# Patient Record
Sex: FEMALE | Race: WHITE | NOT HISPANIC OR LATINO | Employment: UNEMPLOYED | URBAN - METROPOLITAN AREA
[De-identification: names, ages, dates, MRNs, and addresses within clinical notes are randomized per-mention and may not be internally consistent; named-entity substitution may affect disease eponyms.]

---

## 2018-01-03 ENCOUNTER — OFFICE VISIT (OUTPATIENT)
Dept: FAMILY MEDICINE | Facility: CLINIC | Age: 58
End: 2018-01-03
Payer: MEDICARE

## 2018-01-03 VITALS
SYSTOLIC BLOOD PRESSURE: 110 MMHG | OXYGEN SATURATION: 97 % | HEIGHT: 63 IN | TEMPERATURE: 97.5 F | WEIGHT: 160 LBS | HEART RATE: 80 BPM | BODY MASS INDEX: 28.35 KG/M2 | DIASTOLIC BLOOD PRESSURE: 76 MMHG

## 2018-01-03 DIAGNOSIS — F33.9 RECURRENT MAJOR DEPRESSIVE DISORDER, REMISSION STATUS UNSPECIFIED (H): ICD-10-CM

## 2018-01-03 DIAGNOSIS — I10 BENIGN ESSENTIAL HYPERTENSION: ICD-10-CM

## 2018-01-03 DIAGNOSIS — G47.00 INSOMNIA, UNSPECIFIED TYPE: ICD-10-CM

## 2018-01-03 DIAGNOSIS — F41.9 ANXIETY: Primary | ICD-10-CM

## 2018-01-03 DIAGNOSIS — J45.20 MILD INTERMITTENT ASTHMA WITHOUT COMPLICATION: ICD-10-CM

## 2018-01-03 PROCEDURE — 99203 OFFICE O/P NEW LOW 30 MIN: CPT | Performed by: FAMILY MEDICINE

## 2018-01-03 RX ORDER — LISINOPRIL 40 MG/1
40 TABLET ORAL DAILY
Qty: 30 TABLET | Refills: 1 | Status: SHIPPED | OUTPATIENT
Start: 2018-01-03

## 2018-01-03 RX ORDER — LAMOTRIGINE 150 MG/1
150 TABLET ORAL DAILY
Qty: 30 TABLET | Refills: 1 | Status: SHIPPED | OUTPATIENT
Start: 2018-01-03

## 2018-01-03 RX ORDER — TRAZODONE HYDROCHLORIDE 50 MG/1
25 TABLET, FILM COATED ORAL AT BEDTIME
Qty: 30 TABLET | Refills: 1 | Status: SHIPPED | OUTPATIENT
Start: 2018-01-03 | End: 2018-01-26

## 2018-01-03 RX ORDER — MONTELUKAST SODIUM 10 MG/1
10 TABLET ORAL AT BEDTIME
Qty: 30 TABLET | Refills: 1 | Status: SHIPPED | OUTPATIENT
Start: 2018-01-03

## 2018-01-03 ASSESSMENT — PATIENT HEALTH QUESTIONNAIRE - PHQ9
5. POOR APPETITE OR OVEREATING: NEARLY EVERY DAY
SUM OF ALL RESPONSES TO PHQ QUESTIONS 1-9: 12

## 2018-01-03 ASSESSMENT — ANXIETY QUESTIONNAIRES
5. BEING SO RESTLESS THAT IT IS HARD TO SIT STILL: SEVERAL DAYS
IF YOU CHECKED OFF ANY PROBLEMS ON THIS QUESTIONNAIRE, HOW DIFFICULT HAVE THESE PROBLEMS MADE IT FOR YOU TO DO YOUR WORK, TAKE CARE OF THINGS AT HOME, OR GET ALONG WITH OTHER PEOPLE: SOMEWHAT DIFFICULT
6. BECOMING EASILY ANNOYED OR IRRITABLE: MORE THAN HALF THE DAYS
7. FEELING AFRAID AS IF SOMETHING AWFUL MIGHT HAPPEN: MORE THAN HALF THE DAYS
2. NOT BEING ABLE TO STOP OR CONTROL WORRYING: NEARLY EVERY DAY
GAD7 TOTAL SCORE: 17
3. WORRYING TOO MUCH ABOUT DIFFERENT THINGS: NEARLY EVERY DAY
1. FEELING NERVOUS, ANXIOUS, OR ON EDGE: NEARLY EVERY DAY

## 2018-01-03 NOTE — Clinical Note
Please abstract the following data from this visit with this patient into the appropriate field in Epic:  Colonoscopy done on this date: 1/2010 (approximately), by this group: [place]  results were normal

## 2018-01-03 NOTE — MR AVS SNAPSHOT
After Visit Summary   1/3/2018    Jayne Cespedes    MRN: [id number]           Patient Information     Date Of Birth          1960        Visit Information        Provider Department      1/3/2018 4:00 PM Terry Hobson MD [facility]        Today's Diagnoses     Anxiety    -  1    Recurrent major depressive disorder, remission status unspecified (H)        Benign essential hypertension        Mild intermittent asthma without complication        Insomnia, unspecified type           Follow-ups after your visit        Follow-up notes from your care team     Return in about 2 weeks (around 1/17/2018) for Physical Exam.      Your next 10 appointments already scheduled     Jan 16, 2018  8:00 AM CST   Office Visit with Terry Hobson MD   [facility] [place] ([facility])    [address] [place] [id number] [phone]           Bring a current list of meds and any records pertaining to this visit. For Physicals, please bring immunization records and any forms needing to be filled out. Please arrive 10 minutes early to complete paperwork.              Who to contact     If you have questions or need follow up information about today's clinic visit or your schedule please contact [facility] directly at [phone].  Normal or non-critical lab and imaging results will be communicated to you by MyChart, letter or phone within 4 business days after the clinic has received the results. If you do not hear from us within 7 days, please contact the clinic through MyChart or phone. If you have a critical or abnormal lab result, we will notify you by phone as soon as possible.  Submit refill requests through [employer] or call your pharmacy and they will forward the refill request to us. Please allow 3 business days for your refill to be completed.          Additional Information About Your Visit        MyChart Information      "[employer] lets you send messages to your doctor, view your test results, renew your prescriptions, schedule appointments and more. To sign up, go to [email or web address] . Click on \"Log in\" on the left side of the screen, which will take you to the Welcome page. Then click on \"Sign up Now\" on the right side of the page.     You will be asked to enter the access code listed below, as well as some personal information. Please follow the directions to create your username and password.     Your access code is: [email or web address]  Expires: 4/3/2018  4:40 PM     Your access code will  in 90 days. If you need help or a new code, please call your [place] clinic or [phone].        Care EveryWhere ID     This is your Care EveryWhere ID. This could be used by other organizations to access your [place] medical records  [id number]        Your Vitals Were     Pulse Temperature Height Pulse Oximetry BMI (Body Mass Index)       80 97.5  F (36.4  C) (Tympanic) 5' 3.48\" (1.612 m) 97% 27.91 kg/m2        Blood Pressure from Last 3 Encounters:   18 110/76    Weight from Last 3 Encounters:   18 160 lb (72.6 kg)              Today, you had the following     No orders found for display         Today's Medication Changes          These changes are accurate as of: 1/3/18  4:40 PM.  If you have any questions, ask your nurse or doctor.               These medicines have changed or have updated prescriptions.        Dose/Directions    lamoTRIgine 150 MG tablet   Commonly known as:  LaMICtal   This may have changed:    - medication strength  - when to take this   Used for:  Anxiety, Recurrent major depressive disorder, remission status unspecified (H)   Changed by:  Terry Hobson MD        Dose:  150 mg   Take 1 tablet (150 mg) by mouth daily   Quantity:  30 tablet   Refills:  1       lisinopril 40 MG tablet   Commonly known as:  PRINIVIL/ZESTRIL   This may have changed:    - medication strength  - when to take this   Used " for:  Benign essential hypertension   Changed by:  Terry Hobson MD        Dose:  40 mg   Take 1 tablet (40 mg) by mouth daily   Quantity:  30 tablet   Refills:  1       montelukast 10 MG tablet   Commonly known as:  SINGULAIR   This may have changed:    - medication strength  - when to take this   Used for:  Anxiety, Recurrent major depressive disorder, remission status unspecified (H), Mild intermittent asthma without complication   Changed by:  Terry Hobson MD        Dose:  10 mg   Take 1 tablet (10 mg) by mouth At Bedtime   Quantity:  30 tablet   Refills:  1       traZODone 50 MG tablet   Commonly known as:  DESYREL   This may have changed:  when to take this   Used for:  Insomnia, unspecified type   Changed by:  Terry Hobson MD        Dose:  25 mg   Take 0.5 tablets (25 mg) by mouth At Bedtime   Quantity:  30 tablet   Refills:  1            Where to get your medicines      These medications were sent to [id number] [place], [place] - [address]  [address] [address] [facility] [address]     Phone:  [phone]     lamoTRIgine 150 MG tablet    lisinopril 40 MG tablet    montelukast 10 MG tablet    traZODone 50 MG tablet                Primary Care Provider Office Phone # Fax #    Niurka H Daisy Hernandez [phone]       [facility] [address] [place] [address]        Equal Access to Services     [facility] AH: Hadii aad ku hadasho Soomaali, waaxda luqadaha, qaybta kaalmada adeegyada, bre crawfordin hayaan sea lópez . So [facility] [phone].    ATENCIÓN: Si habla español, tiene a georges disposición servicios gratuitos de asistencia lingüística. José Luis al [phone].    We comply with applicable federal civil rights laws and [place] laws. We do not discriminate on the basis of race, color, national origin, age, disability, sex, sexual orientation, or gender identity.            Thank you!     Thank you for choosing [facility]  for your care. Our goal is always to provide you with excellent care. Hearing back from our patients is one way we can continue to improve our services. Please take a few minutes to complete the written survey that you may receive in the mail after your visit with us. Thank you!             Your Updated Medication List - Protect others around you: Learn how to safely use, store and throw away your medicines at www.disposemymeds.org.          This list is accurate as of: 1/3/18  4:40 PM.  Always use your most recent med list.                   Brand Name Dispense Instructions for use Diagnosis    BUSPIRONE HCL PO      Take 30 mg by mouth        lamoTRIgine 150 MG tablet    LaMICtal    30 tablet    Take 1 tablet (150 mg) by mouth daily    Anxiety, Recurrent major depressive disorder, remission status unspecified (H)       lisinopril 40 MG tablet    PRINIVIL/ZESTRIL    30 tablet    Take 1 tablet (40 mg) by mouth daily    Benign essential hypertension       montelukast 10 MG tablet    SINGULAIR    30 tablet    Take 1 tablet (10 mg) by mouth At Bedtime    Anxiety, Recurrent major depressive disorder, remission status unspecified (H), Mild intermittent asthma without complication       traZODone 50 MG tablet    DESYREL    30 tablet    Take 0.5 tablets (25 mg) by mouth At Bedtime    Insomnia, unspecified type       WELLBUTRIN PO      Take 300 mg % by mouth

## 2018-01-03 NOTE — PROGRESS NOTES
SUBJECTIVE:   Jayne Cespedes is a 57 year old female who presents to clinic today for the following health issues:      Medication Followup of  All medications, pt moved from [place]     Taking Medication as prescribed: yes    Side Effects:   Nasal congestion     Medication Helping Symptoms:  yes   Patient is 57 or-year-old female who moved from [place] back to [place].  She has past medical history of depression and anxiety.  She has been taking Lamictal 150 mg along with Wellbutrin and BuSpar.  Last visit she had was 4 months ago in [place].  She denies any new symptoms.  She is stable on these doses.  She is trying to find a new psychiatrist.  Other medical conditions include insomnia for which she takes as needed trazodone along with mild intermittent asthma and takes Singulair and albuterol inhaler.  She is not sure if she has any mammogram in recent years along with Pap smear.  She is planning to follow-up in 1-2 weeks for physical and labs.          Problem list and histories reviewed & adjusted, as indicated.  Additional history: as documented    Patient Active Problem List   Diagnosis     Recurrent major depressive disorder, remission status unspecified (H)     Anxiety     Mild intermittent asthma without complication     Insomnia, unspecified type     No past surgical history on file.    Social History   Substance Use Topics     Smoking status: Light Tobacco Smoker     Smokeless tobacco: Never Used     Alcohol use Yes     No family history on file.      Current Outpatient Prescriptions   Medication Sig Dispense Refill     BUSPIRONE HCL PO Take 30 mg by mouth       BuPROPion HCl (WELLBUTRIN PO) Take 300 mg % by mouth       lisinopril (PRINIVIL/ZESTRIL) 40 MG tablet Take 1 tablet (40 mg) by mouth daily 30 tablet 1     montelukast (SINGULAIR) 10 MG tablet Take 1 tablet (10 mg) by mouth At Bedtime 30 tablet 1     traZODone (DESYREL) 50 MG tablet Take 0.5 tablets (25 mg) by mouth At Bedtime 30 tablet 1  "    lamoTRIgine (LAMICTAL) 150 MG tablet Take 1 tablet (150 mg) by mouth daily 30 tablet 1     [DISCONTINUED] Montelukast Sodium (SINGULAIR PO) Take 10 mg by mouth       [DISCONTINUED] LISINOPRIL PO Take 40 mg by mouth       [DISCONTINUED] LAMOTRIGINE PO Take 150 mg by mouth       [DISCONTINUED] TRAZODONE HCL PO Take 25 mg by mouth           Reviewed and updated as needed this visit by clinical staffTobacco  Allergies  Meds  Soc Hx      Reviewed and updated as needed this visit by Provider         ROS:  C: NEGATIVE for fever, chills, change in weight  E/M: NEGATIVE for ear, mouth and throat problems  R: NEGATIVE for significant cough or SOB  CV: NEGATIVE for chest pain, palpitations or peripheral edema    OBJECTIVE:                                                    /76  Pulse 80  Temp 97.5  F (36.4  C) (Tympanic)  Ht 5' 3.48\" (1.612 m)  Wt 160 lb (72.6 kg)  SpO2 97%  BMI 27.91 kg/m2  Body mass index is 27.91 kg/(m^2).   GENERAL: healthy, alert, well nourished, well hydrated, no distress  NECK: no tenderness, no adenopathy, no asymmetry, no masses, no stiffness; thyroid- normal to palpation  RESP: lungs clear to auscultation - no rales, no rhonchi, no wheezes  CV: regular rates and rhythm, normal S1 S2, no S3 or S4 and no murmur, no click or rub -       ASSESSMENT/PLAN:                                                        ICD-10-CM    1. Anxiety F41.9 montelukast (SINGULAIR) 10 MG tablet     lamoTRIgine (LAMICTAL) 150 MG tablet   2. Recurrent major depressive disorder, remission status unspecified (H) F33.9 montelukast (SINGULAIR) 10 MG tablet     lamoTRIgine (LAMICTAL) 150 MG tablet   3. Benign essential hypertension I10 lisinopril (PRINIVIL/ZESTRIL) 40 MG tablet   4. Mild intermittent asthma without complication J45.20 montelukast (SINGULAIR) 10 MG tablet   5. Insomnia, unspecified type G47.00 traZODone (DESYREL) 50 MG tablet        Discussed with the patient about medication.  She was given 1 " month refill regarding her medications.  She is advised she may need to see a psychiatrist for medical management.  Information provided.  She is advised to follow-up in 2-3 weeks for fasting labs and physical.  Side effects profiles were discussed with the patient      Terry Hobson MD  [facility]

## 2018-01-04 ASSESSMENT — ANXIETY QUESTIONNAIRES: GAD7 TOTAL SCORE: 17

## 2018-01-04 ASSESSMENT — ASTHMA QUESTIONNAIRES: ACT_TOTALSCORE: 15

## 2018-01-16 ENCOUNTER — OFFICE VISIT (OUTPATIENT)
Dept: FAMILY MEDICINE | Facility: CLINIC | Age: 58
End: 2018-01-16
Payer: MEDICARE

## 2018-01-16 VITALS
DIASTOLIC BLOOD PRESSURE: 70 MMHG | BODY MASS INDEX: 27.39 KG/M2 | SYSTOLIC BLOOD PRESSURE: 110 MMHG | WEIGHT: 157 LBS | HEART RATE: 68 BPM | TEMPERATURE: 95.8 F

## 2018-01-16 DIAGNOSIS — G47.00 INSOMNIA, UNSPECIFIED TYPE: ICD-10-CM

## 2018-01-16 DIAGNOSIS — J45.20 MILD INTERMITTENT ASTHMA WITHOUT COMPLICATION: ICD-10-CM

## 2018-01-16 DIAGNOSIS — Z12.4 SCREENING FOR MALIGNANT NEOPLASM OF CERVIX: ICD-10-CM

## 2018-01-16 DIAGNOSIS — Z23 NEED FOR PROPHYLACTIC VACCINATION WITH TETANUS-DIPHTHERIA (TD): ICD-10-CM

## 2018-01-16 DIAGNOSIS — Z11.59 NEED FOR HEPATITIS C SCREENING TEST: ICD-10-CM

## 2018-01-16 DIAGNOSIS — Z12.31 VISIT FOR SCREENING MAMMOGRAM: ICD-10-CM

## 2018-01-16 DIAGNOSIS — Z23 NEED FOR VACCINATION: ICD-10-CM

## 2018-01-16 DIAGNOSIS — F33.9 RECURRENT MAJOR DEPRESSIVE DISORDER, REMISSION STATUS UNSPECIFIED (H): ICD-10-CM

## 2018-01-16 DIAGNOSIS — Z00.00 ENCOUNTER FOR ANNUAL PHYSICAL EXAM: Primary | ICD-10-CM

## 2018-01-16 LAB
ALBUMIN SERPL-MCNC: 3.7 G/DL (ref 3.4–5)
ALP SERPL-CCNC: 64 U/L (ref 40–150)
ALT SERPL W P-5'-P-CCNC: 25 U/L (ref 0–50)
ANION GAP SERPL CALCULATED.3IONS-SCNC: 7 MMOL/L (ref 3–14)
AST SERPL W P-5'-P-CCNC: 18 U/L (ref 0–45)
BILIRUB SERPL-MCNC: 0.3 MG/DL (ref 0.2–1.3)
BUN SERPL-MCNC: 16 MG/DL (ref 7–30)
CALCIUM SERPL-MCNC: 8.7 MG/DL (ref 8.5–10.1)
CHLORIDE SERPL-SCNC: 105 MMOL/L (ref 94–109)
CHOLEST SERPL-MCNC: 162 MG/DL
CO2 SERPL-SCNC: 27 MMOL/L (ref 20–32)
CREAT SERPL-MCNC: 0.68 MG/DL (ref 0.52–1.04)
GFR SERPL CREATININE-BSD FRML MDRD: 89 ML/MIN/1.7M2
GLUCOSE SERPL-MCNC: 98 MG/DL (ref 70–99)
HCV AB SERPL QL IA: NONREACTIVE
HDLC SERPL-MCNC: 80 MG/DL
LDLC SERPL CALC-MCNC: 70 MG/DL
NONHDLC SERPL-MCNC: 82 MG/DL
POTASSIUM SERPL-SCNC: 4.2 MMOL/L (ref 3.4–5.3)
PROT SERPL-MCNC: 6.4 G/DL (ref 6.8–8.8)
SODIUM SERPL-SCNC: 139 MMOL/L (ref 133–144)
TRIGL SERPL-MCNC: 59 MG/DL

## 2018-01-16 PROCEDURE — 99396 PREV VISIT EST AGE 40-64: CPT | Mod: 25 | Performed by: FAMILY MEDICINE

## 2018-01-16 PROCEDURE — G0476 HPV COMBO ASSAY CA SCREEN: HCPCS | Performed by: FAMILY MEDICINE

## 2018-01-16 PROCEDURE — 36415 COLL VENOUS BLD VENIPUNCTURE: CPT | Performed by: FAMILY MEDICINE

## 2018-01-16 PROCEDURE — 80061 LIPID PANEL: CPT | Performed by: FAMILY MEDICINE

## 2018-01-16 PROCEDURE — 90471 IMMUNIZATION ADMIN: CPT | Performed by: FAMILY MEDICINE

## 2018-01-16 PROCEDURE — 80053 COMPREHEN METABOLIC PANEL: CPT | Performed by: FAMILY MEDICINE

## 2018-01-16 PROCEDURE — G0145 SCR C/V CYTO,THINLAYER,RESCR: HCPCS | Performed by: FAMILY MEDICINE

## 2018-01-16 PROCEDURE — G0472 HEP C SCREEN HIGH RISK/OTHER: HCPCS | Performed by: FAMILY MEDICINE

## 2018-01-16 PROCEDURE — 90715 TDAP VACCINE 7 YRS/> IM: CPT | Performed by: FAMILY MEDICINE

## 2018-01-16 RX ORDER — CLOBETASOL PROPIONATE 0.5 MG/G
OINTMENT TOPICAL
COMMUNITY
Start: 2017-03-27

## 2018-01-16 ASSESSMENT — ANXIETY QUESTIONNAIRES
2. NOT BEING ABLE TO STOP OR CONTROL WORRYING: MORE THAN HALF THE DAYS
GAD7 TOTAL SCORE: 12
6. BECOMING EASILY ANNOYED OR IRRITABLE: MORE THAN HALF THE DAYS
5. BEING SO RESTLESS THAT IT IS HARD TO SIT STILL: NOT AT ALL
1. FEELING NERVOUS, ANXIOUS, OR ON EDGE: NEARLY EVERY DAY
3. WORRYING TOO MUCH ABOUT DIFFERENT THINGS: MORE THAN HALF THE DAYS
IF YOU CHECKED OFF ANY PROBLEMS ON THIS QUESTIONNAIRE, HOW DIFFICULT HAVE THESE PROBLEMS MADE IT FOR YOU TO DO YOUR WORK, TAKE CARE OF THINGS AT HOME, OR GET ALONG WITH OTHER PEOPLE: SOMEWHAT DIFFICULT
7. FEELING AFRAID AS IF SOMETHING AWFUL MIGHT HAPPEN: MORE THAN HALF THE DAYS

## 2018-01-16 ASSESSMENT — PATIENT HEALTH QUESTIONNAIRE - PHQ9
SUM OF ALL RESPONSES TO PHQ QUESTIONS 1-9: 7
5. POOR APPETITE OR OVEREATING: SEVERAL DAYS

## 2018-01-16 NOTE — PROGRESS NOTES
SUBJECTIVE:   CC: Jayne Cespedes is an 57 year old woman who presents for preventive health visit.     Healthy Habits:    Do you get at least three servings of calcium containing foods daily (dairy, green leafy vegetables, etc.)? yes    Amount of exercise or daily activities, outside of work: NONE    Problems taking medications regularly No    Medication side effects: No    Have you had an eye exam in the past two years? yes    Do you see a dentist twice per year? no    Do you have sleep apnea, excessive snoring or daytime drowsiness?no    Patient came today for her physical.  According to her she never had any blood work or Pap smear mammogram done in the long time.  She is not currently sexually active.  She has history of smoking addiction. She stopped smoking earlier this year.  She denies any other concerns.  Other issues include chronic issues with depression, anxiety and sleep.  She is planning to see a psychiatrist information was provided in the past and new information given today.  Patient denies any chest pain, shortness of breath.  Her asthma is well controlled on Singulair.  Blood pressure is well controlled on lisinopril.        Today's PHQ-2 Score: No flowsheet data found.    Abuse: Current or Past(Physical, Sexual or Emotional)- No  Do you feel safe in your environment - Yes    Social History   Substance Use Topics     Smoking status: Former Smoker     Types: Cigarettes     Quit date: 1/7/2018     Smokeless tobacco: Never Used     Alcohol use Yes     If you drink alcohol do you typically have >3 drinks per day or >7 drinks per week? No                     Reviewed orders with patient.  Reviewed health maintenance and updated orders accordingly - Yes      Patient over age 50, mutual decision to screen reflected in health maintenance.      Pertinent mammograms are reviewed under the imaging tab.  History of abnormal Pap smear: NO - age 30- 65 PAP every 3 years recommended    Reviewed and  updated as needed this visit by clinical staffTobacco  Allergies  Meds  Fam Hx  Soc Hx        Reviewed and updated as needed this visit by Provider              ROS:  C: NEGATIVE for fever, chills, change in weight  I: NEGATIVE for worrisome rashes, moles or lesions  E: NEGATIVE for vision changes or irritation  ENT: NEGATIVE for ear, mouth and throat problems  R: NEGATIVE for significant cough or SOB  B: NEGATIVE for masses, tenderness or discharge  CV: NEGATIVE for chest pain, palpitations or peripheral edema  GI: NEGATIVE for nausea, abdominal pain, heartburn, or change in bowel habits  : NEGATIVE for unusual urinary or vaginal symptoms. No vaginal bleeding.  M: NEGATIVE for significant arthralgias or myalgia  N: NEGATIVE for weakness, dizziness or paresthesias  P: NEGATIVE for changes in mood or affect     OBJECTIVE:   /70  Pulse 68  Temp 95.8  F (35.4  C) (Tympanic)  Wt 157 lb (71.2 kg)  BMI 27.39 kg/m2  EXAM:  GENERAL: healthy, alert and no distress  EYES: Eyes grossly normal to inspection, PERRL and conjunctivae and sclerae normal  HENT: ear canals and TM's normal, nose and mouth without ulcers or lesions  NECK: no adenopathy, no asymmetry, masses, or scars and thyroid normal to palpation  RESP: lungs clear to auscultation - no rales, rhonchi or wheezes  BREAST: normal without masses, tenderness or nipple discharge and no palpable axillary masses or adenopathy  CV: regular rate and rhythm, normal S1 S2, no S3 or S4, no murmur, click or rub, no peripheral edema and peripheral pulses strong  ABDOMEN: soft, nontender, no hepatosplenomegaly, no masses and bowel sounds normal   (female): normal female external genitalia, normal urethral meatus, vaginal mucosa pink, moist, well rugated, exam was slight painful.  MS: no gross musculoskeletal defects noted, no edema  SKIN: no suspicious lesions or rashes  NEURO: Normal strength and tone, mentation intact and speech normal  PSYCH: mentation appears  normal, affect normal/bright    ASSESSMENT/PLAN:   1. Encounter for annual physical exam  Labs ordered and immunization updated.  She is advised to follow with a psychiatrist for further evaluation.  Once labs reviewed we will refill her blood pressure and asthma medications.  Pap done once resulted we will make further recommendation.    - MA SCREENING DIGITAL BILAT - Future  (s+30); Future  - Hepatitis C Screen Reflex to HCV RNA Quant and Genotype  - Lipid panel reflex to direct LDL Fasting  - Pap imaged thin layer screen with HPV - recommended age 30 - 65 years (select HPV order below)  - Comprehensive metabolic panel    2. Visit for screening mammogram    - MA SCREENING DIGITAL BILAT - Future  (s+30); Future    3. Need for hepatitis C screening test    - Hepatitis C Screen Reflex to HCV RNA Quant and Genotype    4. Screening for malignant neoplasm of cervix    - Pap imaged thin layer screen with HPV - recommended age 30 - 65 years (select HPV order below)    5. Need for prophylactic vaccination with tetanus-diphtheria (TD)      6. Mild intermittent asthma without complication    Asthma well-controlled.  7. Insomnia, unspecified type  On trazodone as needed  - MENTAL HEALTH REFERRAL  - Adult; Psychiatry and Medication Management; Psychiatry; Other: Not Listed - Enter Referral Details in Scheduling Comments Below; Patient call to schedule    8. Recurrent major depressive disorder, remission status unspecified (H)  She is currently on Lamictal, BuSpar, Wellbutrin  - MENTAL HEALTH REFERRAL  - Adult; Psychiatry and Medication Management; Psychiatry; Other: Not Listed - Enter Referral Details in Scheduling Comments Below; Patient call to schedule    9. Need for vaccination    - TDAP VACCINE (ADACEL) [79614.002]  - 1st  Administration  [62707]    COUNSELING:   Reviewed preventive health counseling, as reflected in patient instructions       Healthy diet/nutrition       Vision screening         reports that she quit  "smoking 9 days ago. Her smoking use included Cigarettes. She has never used smokeless tobacco.    Estimated body mass index is 27.39 kg/(m^2) as calculated from the following:    Height as of 1/3/18: 5' 3.48\" (1.612 m).    Weight as of this encounter: 157 lb (71.2 kg).         Counseling Resources:  ATP IV Guidelines  Pooled Cohorts Equation Calculator  Breast Cancer Risk Calculator  FRAX Risk Assessment  ICSI Preventive Guidelines  Dietary Guidelines for Americans, 2010  USDA's MyPlate  ASA Prophylaxis  Lung CA Screening    Terry Hobson MD  [facility]  "

## 2018-01-16 NOTE — LETTER
January 23, 2018    Jayne Cespedes  [address]  [phone]  [place] [address]    Dear Jayne,  We are happy to inform you that your PAP smear result from 1/16/18 is normal.  We are now able to do a follow up test on PAP smears. The DNA test is for HPV (Human Papilloma Virus). Cervical cancer is closely linked with certain types of HPV. Your result showed no evidence of high risk HPV.  Therefore we recommend you return in 3 years for your next pap smear.  You will still need to return to the clinic every year for an annual exam and other preventive tests.  Please contact the clinic at [phone] with any questions.  Sincerely,    Terry Hobson MD/jhonatan

## 2018-01-16 NOTE — LETTER
January 17, 2018      Jayne Cespedes  [address]  [phone]  [place] [address]        Dear ,      I have reviewed your recent labs. Here are the results:     -Liver and gallbladder tests are normal. (ALT,AST, Alk phos, bilirubin), kidney function is normal (Cr, GFR), Sodium is normal, Potassium is normal, Calcium is normal, Glucose is normal (diabetes screening test).   -Cholesterol levels (LDL,HDL, Triglycerides) are normal.  ADVISE: rechecking in 1 year.   -Hepatitis C antibody screen test shows no signs of a previous hepatitis C infection.   - PAP smear result will be resulted separately, it is still pending at this time.     Resulted Orders   Hepatitis C Screen Reflex to HCV RNA Quant and Genotype   Result Value Ref Range    Hepatitis C Antibody Nonreactive NR^Nonreactive      Comment:      Assay performance characteristics have not been established for newborns,   infants, and children     Lipid panel reflex to direct LDL Fasting   Result Value Ref Range    Cholesterol 162 <200 mg/dL    Triglycerides 59 <150 mg/dL      Comment:      Fasting specimen    HDL Cholesterol 80 >49 mg/dL    LDL Cholesterol Calculated 70 <100 mg/dL      Comment:      Desirable:       <100 mg/dl    Non HDL Cholesterol 82 <130 mg/dL   Comprehensive metabolic panel   Result Value Ref Range    Sodium 139 133 - 144 mmol/L    Potassium 4.2 3.4 - 5.3 mmol/L    Chloride 105 94 - 109 mmol/L    Carbon Dioxide 27 20 - 32 mmol/L    Anion Gap 7 3 - 14 mmol/L    Glucose 98 70 - 99 mg/dL      Comment:      Fasting specimen    Urea Nitrogen 16 7 - 30 mg/dL    Creatinine 0.68 0.52 - 1.04 mg/dL    GFR Estimate 89 >60 mL/min/1.7m2      Comment:      Non  GFR Calc    GFR Estimate If Black >90 >60 mL/min/1.7m2      Comment:       GFR Calc    Calcium 8.7 8.5 - 10.1 mg/dL    Bilirubin Total 0.3 0.2 - 1.3 mg/dL    Albumin 3.7 3.4 - 5.0 g/dL    Protein Total 6.4 (L) 6.8 - 8.8 g/dL    Alkaline  Phosphatase 64 40 - 150 U/L    ALT 25 0 - 50 U/L    AST 18 0 - 45 U/L       If you have any questions or concerns, please call the clinic at the number listed above.       Sincerely,    Terry Hobson MD

## 2018-01-16 NOTE — MR AVS SNAPSHOT
After Visit Summary   1/16/2018    Jayne Cespedes    MRN: [id number]           Patient Information     Date Of Birth          1960        Visit Information        Provider Department      1/16/2018 8:00 AM Terry Hobson MD [facility] [place]        Today's Diagnoses     Encounter for annual physical exam    -  1    Visit for screening mammogram        Need for hepatitis C screening test        Screening for malignant neoplasm of cervix        Need for prophylactic vaccination with tetanus-diphtheria (TD)        Mild intermittent asthma without complication        Insomnia, unspecified type        Recurrent major depressive disorder, remission status unspecified (H)          Care Instructions      Preventive Health Recommendations  Female Ages 50 - 64    Yearly exam: See your health care provider every year in order to  o Review health changes.   o Discuss preventive care.    o Review your medicines if your doctor has prescribed any.      Get a Pap test every three years (unless you have an abnormal result and your provider advises testing more often).    If you get Pap tests with HPV test, you only need to test every 5 years, unless you have an abnormal result.     You do not need a Pap test if your uterus was removed (hysterectomy) and you have not had cancer.    You should be tested each year for STDs (sexually transmitted diseases) if you're at risk.     Have a mammogram every 1 to 2 years.    Have a colonoscopy at age 50, or have a yearly FIT test (stool test). These exams screen for colon cancer.      Have a cholesterol test every 5 years, or more often if advised.    Have a diabetes test (fasting glucose) every three years. If you are at risk for diabetes, you should have this test more often.     If you are at risk for osteoporosis (brittle bone disease), think about having a bone density scan (DEXA).    Shots: Get a flu shot each year. Get a tetanus shot every 10  years.    Nutrition:     Eat at least 5 servings of fruits and vegetables each day.    Eat whole-grain bread, whole-wheat pasta and brown rice instead of white grains and rice.    Talk to your provider about Calcium and Vitamin D.     Lifestyle    Exercise at least 150 minutes a week (30 minutes a day, 5 days a week). This will help you control your weight and prevent disease.    Limit alcohol to one drink per day.    No smoking.     Wear sunscreen to prevent skin cancer.     See your dentist every six months for an exam and cleaning.    See your eye doctor every 1 to 2 years.            Follow-ups after your visit        Additional Services     MENTAL HEALTH REFERRAL  - Adult; Psychiatry and Medication Management; Psychiatry; Other: Not Listed - Enter Referral Details in Scheduling Comments Below; Patient call to schedule       [facility]:  Address: [address], [place] [address]   Phone: [phone]                  Future tests that were ordered for you today     Open Future Orders        Priority Expected Expires Ordered    MA SCREENING DIGITAL BILAT - Future  (s+30) Routine  1/16/2019 1/16/2018            Who to contact     If you have questions or need follow up information about today's clinic visit or your schedule please contact [facility] [place] directly at [phone].  Normal or non-critical lab and imaging results will be communicated to you by internetstoreshart, letter or phone within 4 business days after the clinic has received the results. If you do not hear from us within 7 days, please contact the clinic through MyChart or phone. If you have a critical or abnormal lab result, we will notify you by phone as soon as possible.  Submit refill requests through [employer] or call your pharmacy and they will forward the refill request to us. Please allow 3 business days for your refill to be completed.          Additional Information About Your Visit        internetstoreshart  "Information     [employer] lets you send messages to your doctor, view your test results, renew your prescriptions, schedule appointments and more. To sign up, go to [email or web address] . Click on \"Log in\" on the left side of the screen, which will take you to the Welcome page. Then click on \"Sign up Now\" on the right side of the page.     You will be asked to enter the access code listed below, as well as some personal information. Please follow the directions to create your username and password.     Your access code is: [email or web address]  Expires: 4/3/2018  4:40 PM     Your access code will  in 90 days. If you need help or a new code, please call your [facility] clinic or [phone].        Care EveryWhere ID     This is your [facility] EveryWhere ID. This could be used by other organizations to access your [facility] medical records  [id number]        Your Vitals Were     Pulse Temperature BMI (Body Mass Index)             68 95.8  F (35.4  C) (Tympanic) 27.39 kg/m2          Blood Pressure from Last 3 Encounters:   18 110/70   18 110/76    Weight from Last 3 Encounters:   18 157 lb (71.2 kg)   18 160 lb (72.6 kg)              We Performed the Following     Comprehensive metabolic panel     Hepatitis C Screen Reflex to HCV RNA Quant and Genotype     Lipid panel reflex to direct LDL Fasting     MENTAL HEALTH REFERRAL  - Adult; Psychiatry and Medication Management; Psychiatry; Other: Not Listed - Enter Referral Details in Scheduling Comments Below; Patient call to schedule     Pap imaged thin layer screen with HPV - recommended age 30 - 65 years (select HPV order below)        Primary Care Provider Office Phone # Fax #    Niurka Hernandez [phone]       [facility] [address] [facility] [address]        Equal Access to Services     ROSARIO TERRY AH: Amy Grewal, renetta keller, bre jeter " oscar estivendoris harpreetgala welchaamelisa ah. So [facility] [phone].    ATENCIÓN: Si daniel galindo, tiene a georges disposición servicios gratuitos de asistencia lingüística. José Luis modi [phone].    We comply with applicable federal civil rights laws and [place] laws. We do not discriminate on the basis of race, color, national origin, age, disability, sex, sexual orientation, or gender identity.            Thank you!     Thank you for choosing [facility] [place]  for your care. Our goal is always to provide you with excellent care. Hearing back from our patients is one way we can continue to improve our services. Please take a few minutes to complete the written survey that you may receive in the mail after your visit with us. Thank you!             Your Updated Medication List - Protect others around you: Learn how to safely use, store and throw away your medicines at www.disposemymeds.org.          This list is accurate as of: 1/16/18  8:46 AM.  Always use your most recent med list.                   Brand Name Dispense Instructions for use Diagnosis    BUSPIRONE HCL PO      Take 60 mg by mouth        clobetasol 0.05 % ointment    TEMOVATE     Apply bid to skin Prn eczema. Do not apply to face        lamoTRIgine 150 MG tablet    LaMICtal    30 tablet    Take 1 tablet (150 mg) by mouth daily    Anxiety, Recurrent major depressive disorder, remission status unspecified (H)       lisinopril 40 MG tablet    PRINIVIL/ZESTRIL    30 tablet    Take 1 tablet (40 mg) by mouth daily    Benign essential hypertension       montelukast 10 MG tablet    SINGULAIR    30 tablet    Take 1 tablet (10 mg) by mouth At Bedtime    Anxiety, Recurrent major depressive disorder, remission status unspecified (H), Mild intermittent asthma without complication       traZODone 50 MG tablet    DESYREL    30 tablet    Take 0.5 tablets (25 mg) by mouth At Bedtime    Insomnia, unspecified type       WELLBUTRIN PO      Take 300 mg % by mouth

## 2018-01-16 NOTE — NURSING NOTE
"Chief Complaint   Patient presents with     Physical     Fasting        Initial /70  Pulse 68  Temp 95.8  F (35.4  C) (Tympanic)  Wt 157 lb (71.2 kg)  BMI 27.39 kg/m2 Estimated body mass index is 27.39 kg/(m^2) as calculated from the following:    Height as of 1/3/18: 5' 3.48\" (1.612 m).    Weight as of this encounter: 157 lb (71.2 kg).  Medication Reconciliation: complete    Current Outpatient Prescriptions   Medication Sig Dispense Refill     clobetasol (TEMOVATE) 0.05 % ointment Apply bid to skin Prn eczema. Do not apply to face       BUSPIRONE HCL PO Take 60 mg by mouth        BuPROPion HCl (WELLBUTRIN PO) Take 300 mg % by mouth       lisinopril (PRINIVIL/ZESTRIL) 40 MG tablet Take 1 tablet (40 mg) by mouth daily 30 tablet 1     montelukast (SINGULAIR) 10 MG tablet Take 1 tablet (10 mg) by mouth At Bedtime 30 tablet 1     traZODone (DESYREL) 50 MG tablet Take 0.5 tablets (25 mg) by mouth At Bedtime 30 tablet 1     lamoTRIgine (LAMICTAL) 150 MG tablet Take 1 tablet (150 mg) by mouth daily 30 tablet 1       Jamie JOHNSON CMA  "

## 2018-01-17 ASSESSMENT — ANXIETY QUESTIONNAIRES: GAD7 TOTAL SCORE: 12

## 2018-01-17 ASSESSMENT — ASTHMA QUESTIONNAIRES: ACT_TOTALSCORE: 21

## 2018-01-18 LAB
COPATH REPORT: NORMAL
PAP: NORMAL

## 2018-01-22 LAB
FINAL DIAGNOSIS: NORMAL
HPV HR 12 DNA CVX QL NAA+PROBE: NEGATIVE
HPV16 DNA SPEC QL NAA+PROBE: NEGATIVE
HPV18 DNA SPEC QL NAA+PROBE: NEGATIVE
SPECIMEN DESCRIPTION: NORMAL
SPECIMEN SOURCE CVX/VAG CYTO: NORMAL

## 2018-01-25 ENCOUNTER — TELEPHONE (OUTPATIENT)
Dept: FAMILY MEDICINE | Facility: CLINIC | Age: 58
End: 2018-01-25

## 2018-01-25 NOTE — TELEPHONE ENCOUNTER
Jayne Cespedes is a 57 year old female who calls with chest pain.     PRESENTING PROBLEM:  Patient called into clinic with irregular heart beat the last 1-2 weeks. States that it comes and goes. States that she thinks that it is brought on by stress and anxiety. Denies SOB, sweating, N/V or chest pain. Five minutes into the call she admitted having chest pain in the center of her chest that she rates as 2/10.    NURSING ASSESSMENT:  Patient complains of rapid heart rate earlier this morning. States now that she has a dull pain in the center of her chest. Non-radiating chest pressure/discomfort.  Onset: of rapid heart rate: Off and on for 1-2 weeks,   Pain is characterized as dull, no rapid heart rate at the time of this call  Severity mild  Located center of chest   Radiates to none.  Duration intermittent.  Associated symptoms palpitations and stress/anxiety.  Exacerbated by thinks worse with stress or anxiety.  Relieved by none.  Cardiac risk factors: hypertension, overweight and hx of smoking. States that her sister has a rapid heart beat. Sister has not had a heart attack  Associated Medical History: hypertension, anxiety, depression, history of smoking,   Allergies: No Known Allergies    MEDICATIONS:  Taking medication(s) as prescribed? Yes  Taking over the counter medication(s) ?No  Any medication side effects? No significant side effects    Any barriers to taking medication(s) as prescribed?  No  Medication(s) improving/managing symptoms?  No  Medication reconciliation completed: No    Last exam/Treatment:  1/16/18 for CPE with Dr. Hobson    NURSING PLAN: Routed to provider Yes    RECOMMENDED DISPOSITION:  To ED/UC for evaluation, another person to drive - patient refusing after multiple attempts. Requested that I schedule her with Dr. Hobson tomorrow.   Advised that she should seek immediate medical attention due to her symptoms,  and risk factors. Patient agreed to call 911 if worsening symptoms.  Will  comply with recommendation: Unclear if she will seek treatment today.     Understands that if worsening symptoms needs to call 911.    If further questions/concerns or if symptoms do not improve, worsen or new symptoms develop, call your PCP or [facility] as soon as possible.      Guideline used:  Telephone Triage Protocols for Nurses, Fourth Edition, Carol Ann Goyal RN

## 2018-01-26 ENCOUNTER — OFFICE VISIT (OUTPATIENT)
Dept: FAMILY MEDICINE | Facility: CLINIC | Age: 58
End: 2018-01-26
Payer: MEDICARE

## 2018-01-26 VITALS
HEART RATE: 88 BPM | WEIGHT: 160 LBS | SYSTOLIC BLOOD PRESSURE: 138 MMHG | BODY MASS INDEX: 27.91 KG/M2 | DIASTOLIC BLOOD PRESSURE: 80 MMHG | TEMPERATURE: 98.9 F

## 2018-01-26 DIAGNOSIS — L81.9 DISCOLORATION OF SKIN OF FOOT: ICD-10-CM

## 2018-01-26 DIAGNOSIS — R00.2 PALPITATIONS: Primary | ICD-10-CM

## 2018-01-26 PROCEDURE — 93000 ELECTROCARDIOGRAM COMPLETE: CPT | Performed by: FAMILY MEDICINE

## 2018-01-26 PROCEDURE — 99214 OFFICE O/P EST MOD 30 MIN: CPT | Performed by: FAMILY MEDICINE

## 2018-01-26 NOTE — NURSING NOTE
"Chief Complaint   Patient presents with     Heart Problem     irregular heart rate      Foot Problems     discoloration        Initial /80  Pulse 88  Temp 98.9  F (37.2  C) (Tympanic)  Wt 160 lb (72.6 kg)  BMI 27.91 kg/m2 Estimated body mass index is 27.91 kg/(m^2) as calculated from the following:    Height as of 1/3/18: 5' 3.48\" (1.612 m).    Weight as of this encounter: 160 lb (72.6 kg).  Medication Reconciliation: complete    Current Outpatient Prescriptions   Medication Sig Dispense Refill     clobetasol (TEMOVATE) 0.05 % ointment Apply bid to skin Prn eczema. Do not apply to face       BUSPIRONE HCL PO Take 60 mg by mouth        BuPROPion HCl (WELLBUTRIN PO) Take 300 mg % by mouth       lisinopril (PRINIVIL/ZESTRIL) 40 MG tablet Take 1 tablet (40 mg) by mouth daily 30 tablet 1     montelukast (SINGULAIR) 10 MG tablet Take 1 tablet (10 mg) by mouth At Bedtime 30 tablet 1     lamoTRIgine (LAMICTAL) 150 MG tablet Take 1 tablet (150 mg) by mouth daily 30 tablet 1     traZODone (DESYREL) 50 MG tablet Take 0.5 tablets (25 mg) by mouth At Bedtime (Patient not taking: Reported on 1/26/2018) 30 tablet 1       Jamie JOHNSON CMA  "

## 2018-01-26 NOTE — MR AVS SNAPSHOT
"              After Visit Summary   2018    Jayne Cespedes    MRN: [id number]           Patient Information     Date Of Birth          1960        Visit Information        Provider Department      2018 1:40 PM Terry Hobson MD [facility] [place]        Today's Diagnoses     Palpitations    -  1    Discoloration of skin of foot           Follow-ups after your visit        Who to contact     If you have questions or need follow up information about today's clinic visit or your schedule please contact [facility] [place] directly at [phone].  Normal or non-critical lab and imaging results will be communicated to you by AnchorFreehart, letter or phone within 4 business days after the clinic has received the results. If you do not hear from us within 7 days, please contact the clinic through [employer] or phone. If you have a critical or abnormal lab result, we will notify you by phone as soon as possible.  Submit refill requests through [employer] or call your pharmacy and they will forward the refill request to us. Please allow 3 business days for your refill to be completed.          Additional Information About Your Visit        MyChart Information     [employer] lets you send messages to your doctor, view your test results, renew your prescriptions, schedule appointments and more. To sign up, go to [email or web address] . Click on \"Log in\" on the left side of the screen, which will take you to the Welcome page. Then click on \"Sign up Now\" on the right side of the page.     You will be asked to enter the access code listed below, as well as some personal information. Please follow the directions to create your username and password.     Your access code is: [email or web address]  Expires: 4/3/2018  4:40 PM     Your access code will  in 90 days. If you need help or a new code, please call your [facility] or [phone].        Care EveryWhere ID     This is your Care EveryWhere ID. " This could be used by other organizations to access your [facility] medical records  [id number]        Your Vitals Were     Pulse Temperature BMI (Body Mass Index)             88 98.9  F (37.2  C) (Tympanic) 27.91 kg/m2          Blood Pressure from Last 3 Encounters:   01/26/18 138/80   01/16/18 110/70   01/03/18 110/76    Weight from Last 3 Encounters:   01/26/18 160 lb (72.6 kg)   01/16/18 157 lb (71.2 kg)   01/03/18 160 lb (72.6 kg)              We Performed the Following     EKG 12-lead complete w/read - Clinics          Today's Medication Changes          These changes are accurate as of 1/26/18  2:22 PM.  If you have any questions, ask your nurse or doctor.               Stop taking these medicines if you haven't already. Please contact your care team if you have questions.     traZODone 50 MG tablet   Commonly known as:  DESYREL   Stopped by:  Terry Hobson MD                    Primary Care Provider Office Phone # Fax #    [facility] [phone]       [address] [place] [address]        Equal Access to Services     ROSARIO TERRY AH: Hadii kika triana hadasho Soomaali, waaxda luqadaha, qaybta kaalmada adeegyada, bre luu. So [facility] [phone].    ATENCIÓN: Si habla español, tiene a georges disposición servicios gratuitos de asistencia lingüística. Llame al [phone].    We comply with applicable federal civil rights laws and [place] laws. We do not discriminate on the basis of race, color, national origin, age, disability, sex, sexual orientation, or gender identity.            Thank you!     Thank you for choosing [facility]  [place]. Our goal is always to provide you with excellent care. Hearing back from our patients is one way we can continue to improve our services. Please take a few minutes to complete the written survey that you may receive in the mail after your visit with us. Thank you!              Your Updated Medication List - Protect others around you: Learn how to safely use, store and throw away your medicines at www.disposemymeds.org.          This list is accurate as of 1/26/18  2:22 PM.  Always use your most recent med list.                   Brand Name Dispense Instructions for use Diagnosis    BUSPIRONE HCL PO      Take 60 mg by mouth        clobetasol 0.05 % ointment    TEMOVATE     Apply bid to skin Prn eczema. Do not apply to face        lamoTRIgine 150 MG tablet    LaMICtal    30 tablet    Take 1 tablet (150 mg) by mouth daily    Anxiety, Recurrent major depressive disorder, remission status unspecified (H)       lisinopril 40 MG tablet    PRINIVIL/ZESTRIL    30 tablet    Take 1 tablet (40 mg) by mouth daily    Benign essential hypertension       montelukast 10 MG tablet    SINGULAIR    30 tablet    Take 1 tablet (10 mg) by mouth At Bedtime    Anxiety, Recurrent major depressive disorder, remission status unspecified (H), Mild intermittent asthma without complication       WELLBUTRIN PO      Take 300 mg % by mouth

## 2018-01-26 NOTE — PROGRESS NOTES
SUBJECTIVE:   Jayne Cespedes is a 57 year old female who presents to clinic today for the following health issues:      Irregular heart rate      Onset: 1-2 weeks   She feels she may have slight increased palpitation and anxiety of moving.  She denies any chest pain, shortness of breath.      Intensity: moderate    Progression of Symptoms:  worsening    Accompanying Signs & Symptoms:  Shortness of breath: no  Sweating: no  Nausea/vomiting: no  Lightheadedness: no  Palpitations: YES  Fever/Chills: no   Cough: no   Heartburn: no            Concern - discoloration of bottom of feet  Patient noticed some discoloration in the bottom of feet mostly brownish in color.  Denies any pain or numbness.  No burning sensation.  She denies any change in medication          Reviewed and updated as needed this visit by clinical staff  Tobacco  Allergies  Meds  Soc Hx      Reviewed and updated as needed this visit by Provider         ROS:  C: NEGATIVE for fever, chills, change in weight  E/M: NEGATIVE for ear, mouth and throat problems  R: NEGATIVE for significant cough or SOB  CV: NEGATIVE for chest pain, palpitations or peripheral edema    OBJECTIVE:                                                    /80  Pulse 88  Temp 98.9  F (37.2  C) (Tympanic)  Wt 160 lb (72.6 kg)  BMI 27.91 kg/m2  Body mass index is 27.91 kg/(m^2).   GENERAL: healthy, alert, well nourished, well hydrated, no distress  NECK: no tenderness, no adenopathy, no asymmetry, no masses, no stiffness; thyroid- normal to palpation  RESP: lungs clear to auscultation - no rales, no rhonchi, no wheezes  CV: regular rates and rhythm, normal S1 S2, no S3 or S4 and no murmur, no click or rub -  Brownish gray discoloration noted at the bottom of her feet in the plantar aspect.  No painful no swelling no other symptoms     ASSESSMENT/PLAN:                                                        ICD-10-CM    1. Palpitations R00.2 EKG 12-lead complete w/read -  Clinics   2. Discoloration of skin of foot L81.9        Patient has subjective feeling of palpitation.  She is feeling slight anxious because of her move and getting into their apartment.  She does not have any cysts chest pain or shortness of breath.  Her EKG is stable and no acute changes.  I reassured her if there is any chest pain associated with that or any shortness of breath he should follow-up immediately otherwise we need to work on her anxiety.  She is planning to follow with a psychiatrist and she is taking her anxiety medications.    Discoloration in the feet etiology not clear.  It could be stained from some surface patient walked versus possible underlying fungal infection.  I suggested patient to do gentle scrub on her feet.  Use Vaseline to moist them and may be use over-the-counter Lamisil for the next week or so.  If symptom does not improve we will have her see a dermatologist for further evaluation.    Stained from some surface other    Terry Hobson MD  [facility]

## 2018-02-06 PROBLEM — I10 BENIGN ESSENTIAL HYPERTENSION: Status: ACTIVE | Noted: 2018-02-06

## 2018-06-04 ENCOUNTER — TELEPHONE (OUTPATIENT)
Dept: FAMILY MEDICINE | Facility: CLINIC | Age: 58
End: 2018-06-04

## 2018-06-04 NOTE — LETTER
[facility]  [address]  [place] [address]  [phone]        June 5, 2018  Jayne Cespedes  [address]   [place] [address]    Dear Jayne,    I care about your health and have reviewed your health plan. I have reviewed your medical conditions, medication list, and lab results and am making recommendations based on this review, to better manage your health.    You are in particular need of attention regarding:  -Depression    I am recommending that you:  -Please complete the attached PHQ9 and return to the clinic in the enclosed envelope    Here is a list of Health Maintenance topics that are due now or due soon:  Health Maintenance Due   Topic Date Due     Asthma Action Plan - yearly  09/20/1964     Depression Action Plan Review - yearly  09/20/1977     HIV SCREEN (SYSTEM ASSIGNED)  09/20/1977     Mammogram - every 2 years  09/20/2009     Discuss Advance Directive Planning  09/20/2014       Please call us at [phone] (or use [email or web address]) to address the above recommendations.     Thank you for trusting [facility] and we appreciate the opportunity to serve you.  We look forward to supporting your healthcare needs in the future.    Healthy Regards,    Terry Hobson M.D.

## 2018-06-04 NOTE — TELEPHONE ENCOUNTER
The only number listed is not accepting calls.      Attempt # 1  Najma Fisher RN- Triage [employer]

## 2018-06-04 NOTE — TELEPHONE ENCOUNTER
Pt is due now to update PHQ9.  Please call pt. Follow up end date 9/3/18.   PHQ-9 SCORE 1/3/2018 1/16/2018   Total Score 12 7     Jamie JOHNSON CMA

## 2018-06-05 NOTE — TELEPHONE ENCOUNTER
The number listed is not accepting call.  Please mail PHQ-9.  Sammie Mcnally RN - Triage  [facility]

## 2018-10-02 ENCOUNTER — TELEPHONE (OUTPATIENT)
Dept: FAMILY MEDICINE | Facility: CLINIC | Age: 58
End: 2018-10-02

## 2018-10-02 NOTE — TELEPHONE ENCOUNTER
Health Maintenance letter for Mammogram  Was returned to the clinic  Return to sender, insufficient address, unable to forward  Rita VILLAR

## 2022-11-22 ENCOUNTER — HOSPITAL ENCOUNTER (EMERGENCY)
Facility: CLINIC | Age: 62
Discharge: LEFT BEFORE DISCHARGE | End: 2022-11-22
Admitting: EMERGENCY MEDICINE

## 2022-11-22 VITALS
RESPIRATION RATE: 16 BRPM | HEART RATE: 91 BPM | OXYGEN SATURATION: 100 % | DIASTOLIC BLOOD PRESSURE: 72 MMHG | WEIGHT: 157 LBS | TEMPERATURE: 97.7 F | SYSTOLIC BLOOD PRESSURE: 139 MMHG

## 2022-11-22 LAB
ATRIAL RATE - MUSE: 91 BPM
DIASTOLIC BLOOD PRESSURE - MUSE: NORMAL MMHG
INTERPRETATION ECG - MUSE: NORMAL
P AXIS - MUSE: 64 DEGREES
PR INTERVAL - MUSE: 120 MS
QRS DURATION - MUSE: 76 MS
QT - MUSE: 364 MS
QTC - MUSE: 435 MS
R AXIS - MUSE: 51 DEGREES
SYSTOLIC BLOOD PRESSURE - MUSE: NORMAL MMHG
T AXIS - MUSE: 52 DEGREES
VENTRICULAR RATE- MUSE: 86 BPM

## 2022-11-22 PROCEDURE — 93005 ELECTROCARDIOGRAM TRACING: CPT

## 2022-11-22 PROCEDURE — 99283 EMERGENCY DEPT VISIT LOW MDM: CPT

## 2022-11-22 NOTE — ED PROVIDER NOTES
PIT/Triage Evaluation    Patient presented with palpitations, lightheadedness, and confusion. She states that it occurred while she was standing and lasted 30-40 minutes while standing in the sun. She reports that it felt like her heart was racing but that its currently resolved. The patient is from [place] here from the last few months to visit her brother. She is living in a hotel. The patient denies any caffeine use and alcohol use. She does not have a primary care physician but seen an endocrinologist in [place] for thyroid issues.     General:  Alert, interactive  Cardiovascular:  Well perfused  Lungs:  No respiratory distress, no accessory muscle use  Neuro:  Moving all 4 extremities  Skin:  Warm, dry  Psych:  Normal affect    Due to hospital and departmental capacity constraints and prolonged wait times, this patient was evaluated in non-traditional circumstances such as in triage/waiting room, a hallway, etc. I explained the option to wait for a traditional treatment space and apologized for the inconvenience. Given the circumstances, every attempt was made to provide for the patient's comfort and privacy and to perform the most thorough evaluation possible.    I assessed this patient in the triage area.  Discussed the recommended test to do for her.  EKG was done prior to my evaluation shows a normal sinus rhythm.  The patient agreed to do the test however when I left the room the patient stated she was going to go so she ultimately left [place].      I briefly evaluated the patient and developed an initial plan of care. I discussed this plan and explained that this brief interaction does not constitute a full evaluation. Patient/family understands that they should wait to be fully evaluated and discuss any test results with another clinician prior to leaving the hospital.       Stephanie London MD  11/22/22 [id number]

## 2022-11-22 NOTE — ED TRIAGE NOTES
"    She called 911 and was found outside a [place] hotel with her suitcases. States her heart was beating fast. Pt has a history of mental health issues per medics and she told them her sister had a history of rapid HR as well. Medics further state she seems \"somewhat out of it and has some slurred speech.\" She denies drug use to them. 4L EKG WNL.  "

## 2022-11-22 NOTE — ED NOTES
"Pt states that she feels light headed denies CP for SOB. Told pt her BP is normal now she states she \"feels normal\"   "

## 2023-01-09 ENCOUNTER — OFFICE VISIT (OUTPATIENT)
Dept: FAMILY MEDICINE | Facility: CLINIC | Age: 63
End: 2023-01-09
Payer: MEDICARE

## 2023-01-09 VITALS
HEART RATE: 75 BPM | SYSTOLIC BLOOD PRESSURE: 121 MMHG | RESPIRATION RATE: 12 BRPM | DIASTOLIC BLOOD PRESSURE: 84 MMHG | TEMPERATURE: 98.8 F | OXYGEN SATURATION: 97 % | WEIGHT: 150.5 LBS | BODY MASS INDEX: 26.25 KG/M2

## 2023-01-09 DIAGNOSIS — I10 BENIGN ESSENTIAL HYPERTENSION: Primary | ICD-10-CM

## 2023-01-09 DIAGNOSIS — F41.9 ANXIETY: ICD-10-CM

## 2023-01-09 DIAGNOSIS — J45.20 MILD INTERMITTENT ASTHMA WITHOUT COMPLICATION: ICD-10-CM

## 2023-01-09 PROCEDURE — 99203 OFFICE O/P NEW LOW 30 MIN: CPT

## 2023-01-09 RX ORDER — ARIPIPRAZOLE 20 MG/1
1.5 TABLET ORAL DAILY
COMMUNITY
Start: 2023-01-06

## 2023-01-09 RX ORDER — GABAPENTIN 300 MG/1
300 CAPSULE ORAL 3 TIMES DAILY
Qty: 90 CAPSULE | Refills: 0 | Status: SHIPPED | OUTPATIENT
Start: 2023-01-09 | End: 2023-02-08

## 2023-01-09 RX ORDER — ALBUTEROL SULFATE 90 UG/1
2 AEROSOL, METERED RESPIRATORY (INHALATION)
COMMUNITY

## 2023-01-09 RX ORDER — MONTELUKAST SODIUM 10 MG/1
10 TABLET ORAL AT BEDTIME
Qty: 30 TABLET | Refills: 0 | Status: SHIPPED | OUTPATIENT
Start: 2023-01-09 | End: 2023-02-08

## 2023-01-09 RX ORDER — ALBUTEROL SULFATE 90 UG/1
2 AEROSOL, METERED RESPIRATORY (INHALATION) EVERY 6 HOURS PRN
Qty: 18 G | Refills: 0 | Status: SHIPPED | OUTPATIENT
Start: 2023-01-09

## 2023-01-09 RX ORDER — LISINOPRIL 40 MG/1
1 TABLET ORAL DAILY
COMMUNITY
Start: 2022-11-18

## 2023-01-09 RX ORDER — GABAPENTIN 600 MG/1
600 TABLET ORAL
COMMUNITY
Start: 2022-11-18

## 2023-01-09 RX ORDER — LISINOPRIL 40 MG/1
40 TABLET ORAL DAILY
Qty: 30 TABLET | Refills: 0 | Status: SHIPPED | OUTPATIENT
Start: 2023-01-09 | End: 2023-02-08

## 2023-01-09 RX ORDER — BUSPIRONE HYDROCHLORIDE 15 MG/1
30 TABLET ORAL
COMMUNITY
Start: 2023-01-06

## 2023-01-09 RX ORDER — TRAZODONE HYDROCHLORIDE 100 MG/1
100 TABLET ORAL
COMMUNITY
Start: 2022-11-18

## 2023-01-09 ASSESSMENT — ENCOUNTER SYMPTOMS
NEUROLOGICAL NEGATIVE: 1
CARDIOVASCULAR NEGATIVE: 1
RESPIRATORY NEGATIVE: 1
NERVOUS/ANXIOUS: 1
CONSTITUTIONAL NEGATIVE: 1

## 2023-01-09 NOTE — PROGRESS NOTES
Assessment & Plan       ICD-10-CM    1. Benign essential hypertension  I10 lisinopril (ZESTRIL) 40 MG tablet      2. Mild intermittent asthma without complication  J45.20 montelukast (SINGULAIR) 10 MG tablet     albuterol (PROAIR HFA/PROVENTIL HFA/VENTOLIN HFA) 108 (90 Base) MCG/ACT inhaler      3. Anxiety  F41.9 gabapentin (NEURONTIN) 300 MG capsule           Patient instructions: Yoly is to establish care with PCP as she has been in the area for several months and plans to be here several months. She is to take medication as directed.    Medical decision making: Will send in 1 month refill of medications. I decreased dosage of Gabapentin as she has not been taking it appropriately. She will need to follow with PCP for further refills. PDMP checked, last refill November. Has been seen in ED multiple times.     Return if symptoms worsen or fail to improve, for Follow up.    At the end of the encounter, I discussed results, diagnosis, medications. Discussed red flags for immediate return to clinic/ER, as well as indications for follow up if no improvement. Patient understood and agreed to plan. Patient was stable for discharge.    Subjective     Yoly is a 62 year old female who presents to clinic today for the following health issues:  Chief Complaint   Patient presents with     Urgent Care     Medication Request     Lisinopril, montelukast, gabapentin     Yoly presents with reports of needing refills for Lisinopril, Montelukast and Gabapentin. She reports lisinopril for blood pressure, montelukast for asthma, gabapentin for anxiety. She has a PCP in [place]. She states she has a few pills left for Lisinopril and Montelukast and has been taking that. She reports she has been taking her Gabapentin 1-2 times per day as she has been unable to get this and her PCP left practice in [place].           Review of Systems   Constitutional: Negative.    Respiratory: Negative.    Cardiovascular: Negative.    Skin: Negative.     Neurological: Negative.    Psychiatric/Behavioral: The patient is nervous/anxious.        Problem List:  2018-02: Benign essential hypertension  2018-01: Recurrent major depressive disorder, remission status   unspecified (H)  2018-01: Anxiety  2018-01: Mild intermittent asthma without complication  2018-01: Insomnia, unspecified type      Past Medical History:   Diagnosis Date     Mild intermittent asthma without complication 1/3/2018     Recurrent major depressive disorder, remission status unspecified (H) 1/3/2018       Social History     Tobacco Use     Smoking status: Some Days     Types: Cigarettes     Smokeless tobacco: Never   Substance Use Topics     Alcohol use: Yes           Objective    /84   Pulse 75   Temp 98.8  F (37.1  C) (Oral)   Resp 12   Wt 68.3 kg (150 lb 8 oz)   SpO2 97%   BMI 26.25 kg/m    Physical Exam  Constitutional:       Appearance: Normal appearance.   HENT:      Head: Normocephalic and atraumatic.   Musculoskeletal:      Cervical back: Normal range of motion and neck supple.   Skin:     General: Skin is warm and dry.   Neurological:      General: No focal deficit present.      Mental Status: She is alert and oriented to person, place, and time.   Psychiatric:         Mood and Affect: Mood normal.         Thought Content: Thought content normal.              Reji Duval PA-C

## 2023-01-16 ENCOUNTER — OFFICE VISIT (OUTPATIENT)
Dept: FAMILY MEDICINE | Facility: CLINIC | Age: 63
End: 2023-01-16
Payer: MEDICARE

## 2023-01-16 VITALS
WEIGHT: 150 LBS | HEART RATE: 77 BPM | BODY MASS INDEX: 26.17 KG/M2 | SYSTOLIC BLOOD PRESSURE: 107 MMHG | RESPIRATION RATE: 16 BRPM | OXYGEN SATURATION: 98 % | DIASTOLIC BLOOD PRESSURE: 75 MMHG | TEMPERATURE: 99 F

## 2023-01-16 DIAGNOSIS — N30.01 ACUTE CYSTITIS WITH HEMATURIA: Primary | ICD-10-CM

## 2023-01-16 DIAGNOSIS — R30.0 DYSURIA: ICD-10-CM

## 2023-01-16 LAB
ALBUMIN UR-MCNC: NEGATIVE MG/DL
APPEARANCE UR: CLEAR
BILIRUB UR QL STRIP: NEGATIVE
COLOR UR AUTO: YELLOW
GLUCOSE UR STRIP-MCNC: NEGATIVE MG/DL
HGB UR QL STRIP: NEGATIVE
KETONES UR STRIP-MCNC: NEGATIVE MG/DL
LEUKOCYTE ESTERASE UR QL STRIP: ABNORMAL
NITRATE UR QL: NEGATIVE
PH UR STRIP: 5 [PH] (ref 5–7)
SP GR UR STRIP: >=1.03 (ref 1–1.03)
UROBILINOGEN UR STRIP-ACNC: 0.2 E.U./DL

## 2023-01-16 PROCEDURE — 81003 URINALYSIS AUTO W/O SCOPE: CPT | Mod: QW

## 2023-01-16 PROCEDURE — 87086 URINE CULTURE/COLONY COUNT: CPT | Performed by: PHYSICIAN ASSISTANT

## 2023-01-16 PROCEDURE — 99213 OFFICE O/P EST LOW 20 MIN: CPT

## 2023-01-16 RX ORDER — SULFAMETHOXAZOLE/TRIMETHOPRIM 800-160 MG
1 TABLET ORAL 2 TIMES DAILY
Qty: 10 TABLET | Refills: 0 | Status: SHIPPED | OUTPATIENT
Start: 2023-01-16 | End: 2023-01-21

## 2023-01-16 ASSESSMENT — ENCOUNTER SYMPTOMS
CONSTITUTIONAL NEGATIVE: 1
GASTROINTESTINAL NEGATIVE: 1
HEMATURIA: 1
FREQUENCY: 1

## 2023-01-16 NOTE — PROGRESS NOTES
Assessment & Plan       ICD-10-CM    1. Acute cystitis with hematuria  N30.01 sulfamethoxazole-trimethoprim (BACTRIM DS) 800-160 MG tablet     Urine Culture     Urine Culture      2. Dysuria  R30.0 UA without Microscopic     UA without Microscopic           Take antibiotic as directed. Alternate Tylenol and Ibuprofen as needed for fever or discomfort. May trial OTC Pyridium as needed. I recommend cranberry supplements as well. Increase fluids. Monitor for new or worsening symptoms.     UA is suggestive of acute cystitis. Lack of flank pain, fever, nausea make me less concerned for an ascending infection. Patient will be treated with Trimethoprim-sulfamethoxazole (TMP-SMX - Bactrim DS) 160-800 mg PO BID x 5 days until cultures return and will adjust as necessary. Side effects reviewed and discussed. Consider OTC pyridium, cranberry juice and plenty of fluids.  We discussed signs and symptoms that would warrant further evaluation from a health care provider. The plan of care was reviewed and discussed.They understand and agree with the plan. A printed summary was given including instructions and medications.    Return if symptoms worsen or fail to improve.    At the end of the encounter, I discussed results, diagnosis, medications. Discussed red flags for immediate return to clinic/ER, as well as indications for follow up if no improvement. Patient understood and agreed to plan. Patient was stable for discharge.    Subjective     Yoly is a 62 year old female who presents to clinic today for the following health issues:  Chief Complaint   Patient presents with     UTI     Hx of UTI's. Has had these sx for a while. Blood in urine last night     Yoly presents with reports of discomfort urinating, increased frequency and blood in urine last night. She denies fever or flank pain.           Review of Systems   Constitutional: Negative.    Gastrointestinal: Negative.    Genitourinary: Positive for frequency, hematuria and  urgency.   Skin: Negative.        Problem List:  2018-02: Benign essential hypertension  2018-01: Recurrent major depressive disorder, remission status   unspecified (H)  2018-01: Anxiety  2018-01: Mild intermittent asthma without complication  2018-01: Insomnia, unspecified type      Past Medical History:   Diagnosis Date     Mild intermittent asthma without complication 1/3/2018     Recurrent major depressive disorder, remission status unspecified (H) 1/3/2018       Social History     Tobacco Use     Smoking status: Some Days     Types: Cigarettes     Smokeless tobacco: Never   Substance Use Topics     Alcohol use: Yes           Objective    /75   Pulse 77   Temp 99  F (37.2  C) (Tympanic)   Resp 16   Wt 68 kg (150 lb)   SpO2 98%   BMI 26.17 kg/m    Physical Exam  Constitutional:       Appearance: Normal appearance.   HENT:      Head: Normocephalic and atraumatic.   Musculoskeletal:      Cervical back: Normal range of motion and neck supple.   Skin:     General: Skin is warm and dry.   Neurological:      General: No focal deficit present.      Mental Status: She is alert and oriented to person, place, and time.   Psychiatric:         Mood and Affect: Mood normal.         Thought Content: Thought content normal.              Reji Duval PA-C

## 2023-01-18 LAB — BACTERIA UR CULT: NORMAL
